# Patient Record
(demographics unavailable — no encounter records)

---

## 2019-11-08 NOTE — MRI
MRI CERVICAL SPINE NONCONTRAST:

 

INDICATION: 

Cervical radiculopathy.

 

COMPARISON: 

No prior comparison.

 

FINDINGS: 

There is motion artifact which limits the evaluation and degrades image quality.  Multilevel end plat
e degeneration with disk space narrowing and marginal osteophyte formation present. There is degenera
tive hypertrophy of the C1-2 level without high-grade stenosis.  

 

C2-3:  Grade I spondylolisthesis with associated pseudo disk bulge.  There is a superimposed central 
disk protrusion encroaching upon but not significantly deforming the cervical spinal cord.  There is 
asymmetric right facet hypertrophy with facet joint fluid producing moderate to severe right foramina
l stenosis.  Uncinate process hypertrophy is present with mild narrowing of the left neural foramen.

 

C3-4:  Left asymmetric disk-osteophyte flattens the ventral cervical spinal cord with mild narrowing 
of the central canal.  There is bilateral uncinate process and facet hypertrophy with moderate left a
nd mild to moderate right neural foraminal stenosis.  

 

C4-5:  Broad-based disk-osteophyte with moderate central canal stenosis and extrinsic mass effect upo
n the cervical spinal cord.  There is bilateral uncinate process and facet hypertrophy with moderate 
to severe bilateral neural foraminal stenosis.

 

C5-6:  Left asymmetric disk-osteophyte formation with mild central canal stenosis most pronounced at 
the left subarticular zone.  There is mild to moderate left and mild right neural foraminal stenosis.
  

 

C6-7:  Broad-based disk-osteophyte with mild to moderate central canal stenosis and ventral cord flat
tening.  There is a right perineural cyst/focal dilatation of nerve root sleeve.  Mild narrowing of e
ach neural foramen due to uncinate process and facet hypertrophy.

 

C7-T1:  Broad-based disk-osteophyte with mild narrowing of the central canal, mild to moderate left a
nd mild right neural foraminal stenosis.  

 

Patient motion does limit assessment of cord signal.  No acute marrow edema.

 

IMPRESSION: 

Multilevel degenerative change of the cervical spine, as outlined above.

 

POS: [facility]